# Patient Record
Sex: MALE | URBAN - METROPOLITAN AREA
[De-identification: names, ages, dates, MRNs, and addresses within clinical notes are randomized per-mention and may not be internally consistent; named-entity substitution may affect disease eponyms.]

---

## 2018-01-23 ENCOUNTER — OFFICE VISIT (OUTPATIENT)
Dept: INTERNAL MEDICINE | Facility: CLINIC | Age: 28
End: 2018-01-23
Payer: COMMERCIAL

## 2018-01-23 VITALS
DIASTOLIC BLOOD PRESSURE: 51 MMHG | HEART RATE: 51 BPM | BODY MASS INDEX: 25.03 KG/M2 | OXYGEN SATURATION: 99 % | HEIGHT: 73 IN | SYSTOLIC BLOOD PRESSURE: 108 MMHG | WEIGHT: 188.9 LBS

## 2018-01-23 DIAGNOSIS — Z23 NEED FOR INFLUENZA VACCINATION: ICD-10-CM

## 2018-01-23 DIAGNOSIS — Z82.49 FAMILY HISTORY OF HEART DISEASE: ICD-10-CM

## 2018-01-23 DIAGNOSIS — Z76.89 ENCOUNTER TO ESTABLISH CARE: Primary | ICD-10-CM

## 2018-01-23 DIAGNOSIS — Z23 NEED FOR TDAP VACCINATION: ICD-10-CM

## 2018-01-23 LAB
CHOLEST SERPL-MCNC: 161 MG/DL
HDLC SERPL-MCNC: 72 MG/DL
LDLC SERPL CALC-MCNC: 82 MG/DL
NONHDLC SERPL-MCNC: 89 MG/DL
TRIGL SERPL-MCNC: 35 MG/DL

## 2018-01-23 ASSESSMENT — PAIN SCALES - GENERAL: PAINLEVEL: NO PAIN (0)

## 2018-01-23 NOTE — PROGRESS NOTES
"    PRIMARY CARE CLINIC    Resident Clinic Note        Visit Date: January 23, 2018    Prashant Patel  MRN: [id number]  YOB: 1990    CC:  Patient presents to clinic to establish care.     HPI:  Prashant Patel is a 27 year old male  has a past medical history of S/P repair of anterior cruciate ligament.    He reports no acute health issues his length since his last PCP appointment is what triggered this appointment.  He reports that his left knee ACL repair has been good with no problems with instability or pain post PT, surgery was 3+ years ago.  He occasionally has a little bit of pain in his right knee and it sometime takes a little time to \"warm up the knee\", but the pain is significant and doesn't need any pain medications for this pain.  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  He immigrated from [facility] [place] a young age, ~9, and doesn't remember much prior to his time living in [place].  He currently is not taking any medications and is up to date with yearly eye exam (wears corrective lenses), and dental appointments. The patient reports good lifestyle choices including regular activity, good stress management, balanced diet, and no drug, alcohol and limited tobacco exposure.     ROS:  10 point ROS was reviewed and negative other than stated in HPI    Past medical history reviewed.    Past Medical History:   Diagnosis Date     S/P repair of anterior cruciate ligament      Allergies   Allergen Reactions     Seasonal Allergies      Past Surgical History:   Procedure Laterality Date     APPENDECTOMY       ARTHROSCOPIC RECONSTRUCTION ANTERIOR CRUCIATE LIGAMENT Left      HC TOOTH EXTRACTION W/FORCEP         Family History   Problem Relation Age of Onset     Hypertension Mother      HEART DISEASE Maternal Grandmother      Social History     Social History     Marital status: Single     Spouse name: N/A     Number of children: N/A     Years of education: N/A " "    Occupational History     Finance \"Desk Job\"      Social History Main Topics     Smoking status: Never Smoker     Smokeless tobacco: Not on file      Comment: Occational Hookah use, 2-3 puffs every other month     Alcohol use No     Drug use: No     Sexual activity: Not Currently     Other Topics Concern     Not on file     Social History Narrative     No narrative on file     No current outpatient prescriptions on file.     No current facility-administered medications for this visit.      Vitals:  /51  Pulse 51  Ht 1.854 m (6' 1\")  Wt 85.7 kg (188 lb 14.4 oz)  SpO2 99%  BMI 24.92 kg/m2    Physical Exam:  General: NAD, young male  HEENT: Oral mucosa moist and non-erythematous, PERRLA, EOM intact, wearing corrective eye lenses  CV: RRR, normal S1S2, no m/c/r  Resp: Clear to auscultation bilaterally, no wheezes or crackles  Abd: Soft, non-tender, BS+, no masses appreciated  Extremities: WWP, no pedal edema, well healed surgical scare on the medial left wrist  Neuro: AAOx3, no lateralizing symptoms or focal neurologic deficits    Labs:  Recent Labs   Lab Test  01/23/18   0911   CHOL  161   HD  72   LDL  82   TRIG  35     Assessment/Plan:  Prashant 27 year old male  has a past medical history of S/P repair of anterior cruciate ligament, was seen today for establish care.  No acute concerns at today's visit.    #Establish Care    # Need for influenza vaccination:  - FLU VACCINE, AGE >= 3 YR  - TDAP VACCINE (BOOSTRIX)    # Family history of heart disease:  Hypertension and unknown cardiac disease in grandparent, per patient's history.  Otherwise no acute exam or history findings concerning for early cardiovascular disease.  - Lipid Profile FASTING, Normal  - Discussed health lifestyle choices with the patient    Health Maintenance: Immunizations provided today, dental, eye appointment up to date.    Follow-up: As needed    Patient seen and discussed with Dr. Eren Tee MD, " A  Internal Medicine PGY-2  [facility]  Pager: [phone]           Teaching Physician Note:  I was present during the visit and the patient was seen and examined by me.   I discussed the case with the resident and agree with the note as documented by the resident with the following exceptions:  None.    Nickie Jason M.D.  Internal Medicine   pager [phone]

## 2018-01-23 NOTE — MR AVS SNAPSHOT
After Visit Summary   1/23/2018    Prashant Patel    MRN: [id number]           Patient Information     Date Of Birth          1990        Visit Information        Provider Department      1/23/2018 7:30 AM Estuardo Tee MD [facility]        Today's Diagnoses     Need for influenza vaccination    -  1    Need for Tdap vaccination        Family history of heart disease           Follow-ups after your visit        Your next 10 appointments already scheduled     Jan 23, 2018  8:45 AM CST   LAB with  LAB   [facility] ([facility])    [address] [place] [id number] [phone]           Please do not eat 10-12 hours before your appointment if you are coming in fasting for labs on lipids, cholesterol, or glucose (sugar). This does not apply to pregnant women. Water, hot tea and black coffee (with nothing added) are okay. Do not drink other fluids, diet soda or chew gum.              Future tests that were ordered for you today     Open Future Orders        Priority Expected Expires Ordered    Lipid Profile FASTING Routine 1/23/2018 1/23/2019 1/23/2018            Who to contact     Please call your clinic at [phone] to:    Ask questions about your health    Make or cancel appointments    Discuss your medicines    Learn about your test results    Speak to your doctor   If you have compliments or concerns about an experience at your clinic, or if you wish to file a complaint, please contact [facility] Patient Relations at [phone] or email us at [email or web address]         Additional Information About Your Visit        MyChart Information     [employer] is an electronic gateway that provides easy, online access to your medical records. With [employer], you can request a clinic appointment, read your test results, renew a prescription or communicate with your care team.     To sign  "up for MyChart visit the website at [email or web address]   You will be asked to enter the access code listed below, as well as some personal information. Please follow the directions to create your username and password.     Your access code is: [email or web address]  Expires: 2018  6:30 AM     Your access code will  in 90 days. If you need help or a new code, please contact your [facility] or call [phone] for assistance.        Care EveryWhere ID     This is your Care EveryWhere ID. This could be used by other organizations to access your [place] medical records  [id number]        Your Vitals Were     Pulse Height Pulse Oximetry BMI (Body Mass Index)          51 1.854 m (6' 1\") 99% 24.92 kg/m2         Blood Pressure from Last 3 Encounters:   18 108/51    Weight from Last 3 Encounters:   18 85.7 kg (188 lb 14.4 oz)              We Performed the Following     FLU VACCINE, AGE >= 3 YR     TDAP VACCINE (BOOSTRIX)        Primary Care Provider    None Specified       No primary provider on file.        Equal Access to Services     [facility]: Hadii russ bo hadblair aPniagua, waaxda ewa, qaybta kaalmada andrae, hemanth vazquez . So [facility] [phone].    ATENCIÓN: Si habla español, tiene a yo disposición servicios gratuitos de asistencia lingüística. Llame al [phone].    We comply with applicable federal civil rights laws and [place] laws. We do not discriminate on the basis of race, color, national origin, age, disability, sex, sexual orientation, or gender identity.            Thank you!     Thank you for choosing [facility]  for your care. Our goal is always to provide you with excellent care. Hearing back from our patients is one way we can continue to improve our services. Please take a few minutes to complete the written survey that you may receive in the mail after your visit with us. Thank you!   "           Your Updated Medication List - Protect others around you: Learn how to safely use, store and throw away your medicines at www.disposemymeds.org.      Notice  As of 1/23/2018  8:30 AM    You have not been prescribed any medications.